# Patient Record
Sex: MALE | Race: WHITE | URBAN - METROPOLITAN AREA
[De-identification: names, ages, dates, MRNs, and addresses within clinical notes are randomized per-mention and may not be internally consistent; named-entity substitution may affect disease eponyms.]

---

## 2017-01-19 ENCOUNTER — CARE COORDINATION (OUTPATIENT)
Dept: GASTROENTEROLOGY | Facility: CLINIC | Age: 37
End: 2017-01-19

## 2017-01-19 NOTE — PROGRESS NOTES
Asked to call Aleisha in regards to the following message: ERIKA Moraes [phone] from [facility] calling from [place], [place] about pt-- Annabella last saw pt in June, recommendation was to come back for further testing. At that time, pt did not have insurance, but he will have some effective as of 2/1/17, so they are wanting to set up f/u appt. Also wondering if he should have MRI prior to him coming back here.     Scheduled Ron for follow-up appt with Dr Reinoso.  Will need to ask him if there are any needs prior to clinic visit or if he wants him to be scheduled for ERCP as well since patient lives past [place].     Previous was scheduled for ERCP but due to lack of insurance he had to cancel procedure. Now has insurance that will begin 2/1/2017.     Will pose the question to Dr Reinoso but as he is out of town till early next week it may take a little longer to get an answer.     Amenable to plan and verbalized understanding.     Mariam NELSON RN Coordinator  Dr. Klein, Dr. Reinoso & Dr. Atkinson  Pancreas~Biliary  [phone] #4

## 2017-01-26 ENCOUNTER — HOSPITAL ENCOUNTER (OUTPATIENT)
Facility: CLINIC | Age: 37
End: 2017-01-26
Attending: INTERNAL MEDICINE | Admitting: INTERNAL MEDICINE
Payer: COMMERCIAL

## 2017-01-26 ENCOUNTER — TELEPHONE (OUTPATIENT)
Dept: GASTROENTEROLOGY | Facility: CLINIC | Age: 37
End: 2017-01-26

## 2017-01-26 ENCOUNTER — CARE COORDINATION (OUTPATIENT)
Dept: GASTROENTEROLOGY | Facility: CLINIC | Age: 37
End: 2017-01-26

## 2017-01-26 DIAGNOSIS — R10.9 ABDOMINAL PAIN: Primary | ICD-10-CM

## 2017-01-26 NOTE — PROGRESS NOTES
Left message for Aleisha JAMES from [facility] [place]: [phone] to discuss the following.    Received direction from Dr Reinoso regarding follow-up: need a contrasted CT of the abomen and an updated MRCP with secretin before the clinic visit which itseff should happen before reserved time in the OR for EUS with transluminal drainage and possible ERCP.    Scheduled to see in clinic 2/28/2017.    Mariam NELSON RN Coordinator  Dr. Klein, Dr. Reinoso & Dr. Atkinson  Pancreas~Biliary  [phone] #4

## 2017-02-27 RX ORDER — LIDOCAINE 40 MG/G
CREAM TOPICAL
Status: CANCELLED | OUTPATIENT
Start: 2017-02-27

## 2017-02-27 RX ORDER — INDOMETHACIN 50 MG/1
100 SUPPOSITORY RECTAL
Status: CANCELLED | OUTPATIENT
Start: 2017-02-27

## 2017-02-27 RX ORDER — SACCHAROMYCES BOULARDII 250 MG
250 CAPSULE ORAL 2 TIMES DAILY
COMMUNITY
End: 2017-02-28 | Stop reason: HOSPADM

## 2017-02-27 RX ORDER — MULTIPLE VITAMINS W/ MINERALS TAB 9MG-400MCG
1 TAB ORAL DAILY
COMMUNITY
End: 2017-02-28 | Stop reason: HOSPADM

## 2017-02-27 RX ORDER — CETIRIZINE HYDROCHLORIDE 10 MG/1
10 TABLET ORAL DAILY
COMMUNITY
End: 2017-02-28 | Stop reason: HOSPADM

## 2017-02-28 ENCOUNTER — OFFICE VISIT (OUTPATIENT)
Dept: GASTROENTEROLOGY | Facility: CLINIC | Age: 37
End: 2017-02-28

## 2017-02-28 VITALS
BODY MASS INDEX: 45.51 KG/M2 | OXYGEN SATURATION: 95 % | SYSTOLIC BLOOD PRESSURE: 122 MMHG | HEART RATE: 90 BPM | DIASTOLIC BLOOD PRESSURE: 83 MMHG | WEIGHT: 315 LBS

## 2017-02-28 DIAGNOSIS — K85.00 IDIOPATHIC ACUTE PANCREATITIS WITHOUT INFECTION OR NECROSIS: Primary | ICD-10-CM

## 2017-02-28 ASSESSMENT — PAIN SCALES - GENERAL: PAINLEVEL: EXTREME PAIN (8)

## 2017-02-28 NOTE — MR AVS SNAPSHOT
After Visit Summary   2/28/2017    Ron Armenta    MRN: [id number]           Patient Information     Date Of Birth          1980        Visit Information        Provider Department      2/28/2017 6:45 AM Vikash Reinoso MD [facility] Pancreas and Biliary        Today's Diagnoses     Idiopathic acute pancreatitis without infection or necrosis    -  1       Follow-ups after your visit        Your next 10 appointments already scheduled     Mar 01, 2017   Procedure with Vikash Reinoso MD   [facility] [place], Same Day Surgery (--)    [address]   [place] [id number] [phone]              Who to contact     Please call your clinic at [phone] to:    Ask questions about your health    Make or cancel appointments    Discuss your medicines    Learn about your test results    Speak to your doctor   If you have compliments or concerns about an experience at your clinic, or if you wish to file a complaint, please contact [facility] Patient Relations at [phone] or email us at [email or web address]         Additional Information About Your Visit        MyChart Information     [employer] gives you secure access to your electronic health record. If you see a primary care provider, you can also send messages to your care team and make appointments. If you have questions, please call your primary care clinic.  If you do not have a primary care provider, please call [phone] and they will assist you.      [employer] is an electronic gateway that provides easy, online access to your medical records. With [employer], you can request a clinic appointment, read your test results, renew a prescription or communicate with your care team.     To access your existing account, please contact your [facility] or call [phone] for assistance.        Care EveryWhere ID     This is your Care EveryWhere ID. This could be used by other  organizations to access your [place] medical records  [id number]        Your Vitals Were     Pulse Pulse Oximetry BMI (Body Mass Index)             90 95% 45.51 kg/m2          Blood Pressure from Last 3 Encounters:   02/28/17 122/83   06/24/16 108/66    Weight from Last 3 Encounters:   02/28/17 (!) 143.9 kg (317 lb 3.2 oz)   02/27/17 (!) 140.6 kg (310 lb)   06/24/16 111 kg (244 lb 12.8 oz)              Today, you had the following     No orders found for display       Primary Care Provider    Kandi Mandiharjinder       No address on file        Thank you!     Thank you for choosing [facility] PANCREAS AND BILIARY  for your care. Our goal is always to provide you with excellent care. Hearing back from our patients is one way we can continue to improve our services. Please take a few minutes to complete the written survey that you may receive in the mail after your visit with us. Thank you!             Your Updated Medication List - Protect others around you: Learn how to safely use, store and throw away your medicines at www.disposemymeds.org.          This list is accurate as of: 2/28/17  7:14 AM.  Always use your most recent med list.                   Brand Name Dispense Instructions for use    albuterol 108 (90 BASE) MCG/ACT Inhaler    PROAIR HFA/PROVENTIL HFA/VENTOLIN HFA     Inhale 2 puffs into the lungs every 6 hours       amylase-lipase-protease 35820 UNITS Cpep    CREON 12     Take 36,000 Units by mouth 3 times daily (with meals)       beclomethasone 40 MCG/ACT Inhaler    QVAR     Inhale 1 puff into the lungs 2 times daily       cetirizine 10 MG tablet    zyrTEC     Take 10 mg by mouth daily       CULTURELLE PO          FLEXERIL PO      Take 10 mg by mouth 3 times daily as needed for muscle spasms       GENTLE STOOL SOFTENER PO          MELATONIN PO      Take 3 mg by mouth nightly as needed       MOTRIN IB PO      Take 200 mg by mouth every 6 hours as needed for moderate pain       Multi-vitamin Tabs tablet       Take 1 tablet by mouth daily       NONFORMULARY      Fiber gummies daily       saccharomyces boulardii 250 MG capsule    FLORASTOR     Take 250 mg by mouth 2 times daily       TRAMADOL HCL PO      Take 100 mg by mouth every 6 hours as needed       TYLENOL PO      Take 325 mg by mouth every 6 hours as needed for mild pain or fever       ZOFRAN PO      Take 8 mg by mouth every 8 hours as needed for nausea or vomiting       ZOLPIDEM TARTRATE PO      Take 5 mg by mouth nightly as needed

## 2017-02-28 NOTE — NURSING NOTE
He is having pain in lower back started feb 6th, rates it at 8. No pain during eating. Has regular bowel movements.denies nausea,voimting.

## 2017-02-28 NOTE — PROGRESS NOTES
Therapeutic Endoscopy Clinic Visit    CC/Referring Physician:  Viji Oneill NP; Ravindra Coronado MD  Question for Consultation:  Follow up of known necrotizing pancreatitis    Assessment and Plan:  Mr Armenta is a delightful 35yo gentleman with a history of complex necrotizing pancreatitis managed in part by surgical cystogastrostomy with imaging and clinical course demonstrating resolving pocket of necrosis without indication for intervention. He is struggling with his weight, obesity, and we discussed lifestyle modifications. A large part of his eating seems to be related to depression.    Plan:  1. Improve lifestyle, including daily exercise and improved eating habits (smaller, lower fat meals)  2. He would like to defer seeing a psychologist  3. No indication based on clinical course and imaging for invasive procedures, we will cancel  4. Repeat imaging in one year by contrasted CT (locally) unless course dictates a shorter interval  5. Continue enzymes at low dose    RTC as clinical course dictates; unexplained fevers, difficulty with postprandial pain, increasing abdominal pain    Thank you for this consultation.  It was a pleasure to participate in the care of this patient; please contact us with any further questions.  A total of 40 minutes was spent with this patient, >50% of which was counseling regarding the above delineated issues.    Vikash Reinoso MD PhD  Interventional and Therapeutic Endoscopy  Director of Endoscopy   of Medicine    [facility]  Department of Medicine  Division of Gastroenterology and Hepatology  [facility] [address] [address]    Clinical                 [phone]  Administrative       [phone]  Administrative Fax [phone]    -------------------------------------------------------------------------------------------------------------------  History of Presentation:  Mr Armenta is a 35yo gentleman  with a history of idiopathic (possibly gallstone, post conner) acute necrotizing pancreatitis complicated by pancreatic empyema managed by chest tube and surgical cystogastrostomy. Imaging from June demonstrated a persistent large pancreatic and peripancreatic walled off necrosis with evidence of previous surgical connection. With continued intermittent severe pain that was debilitating mixed with well intervals we requested reimaging. Due to distance this was delayed and MRI/MRCP/CT from yesterday demonstrates significant decrease in the size of the mostly fluid based lesion, now measuring less than 5cm and without clear main duct communication. Moreover, the lesion now appears within healthy pancreatic parenchyma and more distant from the lumen.    For the past several months Mr Armenta has been without abdominal pain and tolerating a diet without issue. He has no problems with diarrhea, on pancreatic enzymes 36K with meals. There was a period without the enzymes where he loose stools. He is not diabetic and he has gained significant weight since our last visit. He has not had unexplained fevers. He does complain of lower back pain, thought muscle related. He has had some rough personal stuff going on and has known depression.    Review of systemts:  A twelve point review was performed and was otherwise negative beyond what is mentioned in the history above.    Pertinent past medical history:  Past Medical History   Diagnosis Date     DJD (degenerative joint disease)      History of blood transfusion      Pancreatic disease      pancreatitis     Pleural effusion      Previous surgeries:  Past Surgical History   Procedure Laterality Date     Cholecystectomy       Lung surgery       right lung     Pancreas surgery       pseudocyst     Current mediations:  Current Outpatient Prescriptions   Medication     MOTRIN IB PO     Acetaminophen (TYLENOL PO)     cetirizine (ZYRTEC) 10 MG tablet     Ondansetron HCl (ZOFRAN PO)      saccharomyces boulardii (FLORASTOR) 250 MG capsule     NONFORMULARY     beclomethasone (QVAR) 40 MCG/ACT Inhaler     multivitamin, therapeutic with minerals (MULTI-VITAMIN) TABS tablet     Cyclobenzaprine HCl (FLEXERIL PO)     amylase-lipase-protease (CREON 12) 46575 UNITS CPEP     Lactobacillus Rhamnosus, GG, (CULTURELLE PO)     Docusate Sodium (GENTLE STOOL SOFTENER PO)     albuterol (PROAIR HFA, PROVENTIL HFA, VENTOLIN HFA) 108 (90 BASE) MCG/ACT inhaler     TRAMADOL HCL PO     MELATONIN PO     ZOLPIDEM TARTRATE PO     No current facility-administered medications for this visit.      Medications reviewed with patient today, see Medication List/Assessment for details.  No other NSAID/anticoagulation reported by patient.  No other OTC/herbal/supplements reported by patient.    Social history:  Social History     Social History     Marital status:      Spouse name: N/A     Number of children: N/A     Years of education: N/A     Occupational History     Not on file.     Social History Main Topics     Smoking status: Never Smoker     Smokeless tobacco: Not on file     Alcohol use No     Drug use: Not on file     Sexual activity: Not on file     Other Topics Concern     Not on file     Social History Narrative     Family history:  No colon/panc/other GI CA, no other HNPCC-related Chago.  No IBD/celiac, no AI/liver disease.    PHYSICAL EXAMINATION:  Vitals reviewed, AFVSS   Wt   Wt Readings from Last 2 Encounters:   02/28/17 (!) 143.9 kg (317 lb 3.2 oz)   02/27/17 (!) 140.6 kg (310 lb)      Gen: nontoxic, aaox3, cooperative, pleasant, not dyspneic/diaphoretic  HEENT: neck supple, normal op w/o ulcer/exudate, anicteric  Resp/CV unremarkable without significant finding  Abd: obese, large well healed scar, benign, soft, nondistended, intact bs, no hepatosplenomegaly, nontender, no peritoneal signs  Ext: no c/c/e  Skin: warm, perfused, no jaundice  Neuro: grossly intact    Pertinent outside studies:

## 2017-02-28 NOTE — LETTER
2/28/2017       RE: Ron Armenta  [address]  [place] [address]     Dear Colleague,    Thank you for referring your patient, Ron Armenta, to the [facility] PANCREAS [facility] BIL[facility]. Please see a copy of my visit note below.    Therapeutic Endoscopy Clinic Visit    CC/Referring Physician:  Viji Su NP; Ravindra Coronado MD  Question for Consultation:  Follow up of known necrotizing pancreatitis    Assessment and Plan:  Mr Armenta is a delightful 35yo gentleman with a history of complex necrotizing pancreatitis managed in part by surgical cystogastrostomy with imaging and clinical course demonstrating resolving pocket of necrosis without indication for intervention. He is struggling with his weight, obesity, and we discussed lifestyle modifications. A large part of his eating seems to be related to depression.    Plan:  1. Improve lifestyle, including daily exercise and improved eating habits (smaller, lower fat meals)  2. He would like to defer seeing a psychologist  3. No indication based on clinical course and imaging for invasive procedures, we will cancel  4. Repeat imaging in one year by contrasted CT (locally) unless course dictates a shorter interval  5. Continue enzymes at low dose    RTC as clinical course dictates; unexplained fevers, difficulty with postprandial pain, increasing abdominal pain    Thank you for this consultation.  It was a pleasure to participate in the care of this patient; please contact us with any further questions.  A total of 40 minutes was spent with this patient, >50% of which was counseling regarding the above delineated issues.    Vikash Reinoso MD PhD  Interventional and Therapeutic Endoscopy  Director of Endoscopy   of Medicine    [facility]  Department of Medicine  Division of Gastroenterology and Hepatology  [facility] [address] [place], [place]  [address]    Clinical                 [phone]  Administrative       [phone]  Administrative Fax [phone]    -------------------------------------------------------------------------------------------------------------------  History of Presentation:  Mr Armenta is a 35yo gentleman with a history of idiopathic (possibly gallstone, post conner) acute necrotizing pancreatitis complicated by pancreatic empyema managed by chest tube and surgical cystogastrostomy. Imaging from June demonstrated a persistent large pancreatic and peripancreatic walled off necrosis with evidence of previous surgical connection. With continued intermittent severe pain that was debilitating mixed with well intervals we requested reimaging. Due to distance this was delayed and MRI/MRCP/CT from yesterday demonstrates significant decrease in the size of the mostly fluid based lesion, now measuring less than 5cm and without clear main duct communication. Moreover, the lesion now appears within healthy pancreatic parenchyma and more distant from the lumen.    For the past several months Mr Armenta has been without abdominal pain and tolerating a diet without issue. He has no problems with diarrhea, on pancreatic enzymes 36K with meals. There was a period without the enzymes where he loose stools. He is not diabetic and he has gained significant weight since our last visit. He has not had unexplained fevers. He does complain of lower back pain, thought muscle related. He has had some rough personal stuff going on and has known depression.    Review of systemts:  A twelve point review was performed and was otherwise negative beyond what is mentioned in the history above.    Pertinent past medical history:  Past Medical History   Diagnosis Date     DJD (degenerative joint disease)      History of blood transfusion      Pancreatic disease      pancreatitis     Pleural effusion      Previous surgeries:  Past Surgical History   Procedure  Laterality Date     Cholecystectomy       Lung surgery       right lung     Pancreas surgery       pseudocyst     Current mediations:  Current Outpatient Prescriptions   Medication     MOTRIN IB PO     Acetaminophen (TYLENOL PO)     cetirizine (ZYRTEC) 10 MG tablet     Ondansetron HCl (ZOFRAN PO)     saccharomyces boulardii (FLORASTOR) 250 MG capsule     NONFORMULARY     beclomethasone (QVAR) 40 MCG/ACT Inhaler     multivitamin, therapeutic with minerals (MULTI-VITAMIN) TABS tablet     Cyclobenzaprine HCl (FLEXERIL PO)     amylase-lipase-protease (CREON 12) 60021 UNITS CPEP     Lactobacillus Rhamnosus, GG, (CULTURELLE PO)     Docusate Sodium (GENTLE STOOL SOFTENER PO)     albuterol (PROAIR HFA, PROVENTIL HFA, VENTOLIN HFA) 108 (90 BASE) MCG/ACT inhaler     TRAMADOL HCL PO     MELATONIN PO     ZOLPIDEM TARTRATE PO     No current facility-administered medications for this visit.      Medications reviewed with patient today, see Medication List/Assessment for details.  No other NSAID/anticoagulation reported by patient.  No other OTC/herbal/supplements reported by patient.    Social history:  Social History     Social History     Marital status:      Spouse name: N/A     Number of children: N/A     Years of education: N/A     Occupational History     Not on file.     Social History Main Topics     Smoking status: Never Smoker     Smokeless tobacco: Not on file     Alcohol use No     Drug use: Not on file     Sexual activity: Not on file     Other Topics Concern     Not on file     Social History Narrative     Family history:  No colon/panc/other GI CA, no other HNPCC-related Chago.  No IBD/celiac, no AI/liver disease.    PHYSICAL EXAMINATION:  Vitals reviewed, AFVSS   Wt   Wt Readings from Last 2 Encounters:   02/28/17 (!) 143.9 kg (317 lb 3.2 oz)   02/27/17 (!) 140.6 kg (310 lb)      Gen: nontoxic, aaox3, cooperative, pleasant, not dyspneic/diaphoretic  HEENT: neck supple, normal op w/o ulcer/exudate,  anicteric  Resp/CV unremarkable without significant finding  Abd: obese, large well healed scar, benign, soft, nondistended, intact bs, no hepatosplenomegaly, nontender, no peritoneal signs  Ext: no c/c/e  Skin: warm, perfused, no jaundice  Neuro: grossly intact    Pertinent outside studies:        Again, thank you for allowing me to participate in the care of your patient.      Sincerely,    Vikash Reinoso MD

## 2018-01-04 DIAGNOSIS — R10.9 ABDOMINAL PAIN: Primary | ICD-10-CM

## 2018-07-11 ENCOUNTER — RADIANT APPOINTMENT (OUTPATIENT)
Dept: CT IMAGING | Facility: CLINIC | Age: 38
End: 2018-07-11
Attending: INTERNAL MEDICINE
Payer: COMMERCIAL

## 2018-07-11 DIAGNOSIS — R10.9 ABDOMINAL PAIN: ICD-10-CM

## 2018-07-11 RX ORDER — IOPAMIDOL 755 MG/ML
135 INJECTION, SOLUTION INTRAVASCULAR ONCE
Status: COMPLETED | OUTPATIENT
Start: 2018-07-11 | End: 2018-07-11

## 2018-07-11 RX ADMIN — IOPAMIDOL 135 ML: 755 INJECTION, SOLUTION INTRAVASCULAR at 11:59

## 2018-07-11 NOTE — DISCHARGE INSTRUCTIONS

## 2018-07-12 ENCOUNTER — OFFICE VISIT (OUTPATIENT)
Dept: GASTROENTEROLOGY | Facility: CLINIC | Age: 38
End: 2018-07-12
Payer: COMMERCIAL

## 2018-07-12 VITALS
TEMPERATURE: 98.8 F | HEIGHT: 70 IN | OXYGEN SATURATION: 94 % | HEART RATE: 102 BPM | DIASTOLIC BLOOD PRESSURE: 85 MMHG | SYSTOLIC BLOOD PRESSURE: 136 MMHG | WEIGHT: 315 LBS | BODY MASS INDEX: 45.1 KG/M2

## 2018-07-12 DIAGNOSIS — K85.11 ACUTE BILIARY PANCREATITIS WITH UNINFECTED NECROSIS: Primary | ICD-10-CM

## 2018-07-12 RX ORDER — LATANOPROST 50 UG/ML
SOLUTION/ DROPS OPHTHALMIC
Refills: 3 | COMMUNITY
Start: 2018-05-30

## 2018-07-12 ASSESSMENT — PAIN SCALES - GENERAL: PAINLEVEL: EXTREME PAIN (9)

## 2018-07-12 NOTE — NURSING NOTE
"Chief Complaint   Patient presents with     Consult     Patient returning for 1 year follow-up.       Vitals:    07/12/18 0950   BP: 136/85   BP Location: Left arm   Patient Position: Sitting   Cuff Size: Adult Regular   Pulse: 102   Temp: 98.8  F (37.1  C)   TempSrc: Oral   SpO2: 94%   Weight: (!) 343 lb 14.4 oz   Height: 5' 10\"       Body mass index is 49.34 kg/(m^2).      Altagracia Bah, EMT                      "

## 2018-07-12 NOTE — MR AVS SNAPSHOT
After Visit Summary   7/12/2018    Ron Armenta    MRN: [id number]           Patient Information     Date Of Birth          1980        Visit Information        Provider Department      7/12/2018 10:15 AM Vikash Reinoso MD [facility] Pancreas and Biliary        Today's Diagnoses     Acute biliary pancreatitis with uninfected necrosis    -  1       Follow-ups after your visit        Your next 10 appointments already scheduled     Jul 12, 2018 10:15 AM CDT   (Arrive by 10:00 AM)   Return Visit with MD BLAIR [facility] Pancreas and Biliary ([facility])    [address] [place] [id number] [phone]              Who to contact     Please call your clinic at [phone] to:    Ask questions about your health    Make or cancel appointments    Discuss your medicines    Learn about your test results    Speak to your doctor            Additional Information About Your Visit        MyChart Information     [employer] gives you secure access to your electronic health record. If you see a primary care provider, you can also send messages to your care team and make appointments. If you have questions, please call your primary care clinic.  If you do not have a primary care provider, please call [phone] and they will assist you.      [employer] is an electronic gateway that provides easy, online access to your medical records. With [employer], you can request a clinic appointment, read your test results, renew a prescription or communicate with your care team.     To access your existing account, please contact your [facility] or call [phone] for assistance.        Care EveryWhere ID     This is your Care EveryWhere ID. This could be used by other organizations to access your [place] medical records  [id number]        Your Vitals Were     Pulse Temperature Height Pulse Oximetry BMI (Body Mass  "Index)       102 98.8  F (37.1  C) (Oral) 5' 10\" 94% 49.34 kg/m2        Blood Pressure from Last 3 Encounters:   07/12/18 136/85   02/28/17 122/83   06/24/16 108/66    Weight from Last 3 Encounters:   07/12/18 (!) 343 lb 14.4 oz   02/28/17 (!) 317 lb 3.2 oz   02/27/17 (!) 310 lb              Today, you had the following     No orders found for display       Primary Care Provider    Kandi Munoz       No address on file        Equal Access to Services     [facility]: Hadii aad ku keeo Son[place]ah, waaxda luqadaha, qaybta kaalmada anupama, caryl wagoner . So [facility] [phone].    ATENCIÓN: Si habla español, tiene a dave disposición servicios gratuitos de asistencia lingüística. Llame al [phone].    We comply with applicable federal civil rights laws and [place] laws. We do not discriminate on the basis of race, color, national origin, age, disability, sex, sexual orientation, or gender identity.            Thank you!     Thank you for choosing [facility] PANCREAS AND BILIARY  for your care. Our goal is always to provide you with excellent care. Hearing back from our patients is one way we can continue to improve our services. Please take a few minutes to complete the written survey that you may receive in the mail after your visit with us. Thank you!             Your Updated Medication List - Protect others around you: Learn how to safely use, store and throw away your medicines at www.disposemymeds.org.          This list is accurate as of 7/12/18 10:08 AM.  Always use your most recent med list.                   Brand Name Dispense Instructions for use Diagnosis    albuterol 108 (90 Base) MCG/ACT Inhaler    PROAIR HFA/PROVENTIL HFA/VENTOLIN HFA     Inhale 2 puffs into the lungs every 6 hours        amylase-lipase-protease 67698 units Cpep    CREON 12     Take 36,000 Units by mouth 3 times daily (with meals)        CULTURELLE PO           GENTLE STOOL SOFTENER PO           " latanoprost 0.005 % ophthalmic solution    XALATAN     USE ONE DROP IN BOTH EYES DAILY AT BEDTIME        MELATONIN PO      Take 3 mg by mouth nightly as needed        MOTRIN IB PO      Take 200 mg by mouth every 6 hours as needed for moderate pain        TRAMADOL HCL PO      Take 100 mg by mouth every 6 hours as needed        TYLENOL PO      Take 325 mg by mouth every 6 hours as needed for mild pain or fever        ZOLPIDEM TARTRATE PO      Take 5 mg by mouth nightly as needed

## 2018-07-12 NOTE — PROGRESS NOTES
Therapeutic Endoscopy Clinic Visit    CC/Referring Physician:  Viji Oneill NP and Ravindra Coronado MD  Question for Consultation:  Follow up for known necrotizing pancreatitis    Assessment and Plan:  Mr Armenta is a 37yo gentleman with a history of necrotizing pancreatitis managed elsewhere by surgical cystogastrostomy. Given his slow steady improvement since that procedure, we have elected to defer invasive procedures. He will continue his low dose pancreatic enzymes. We will obtain interval imaging and or organize follow up clinic visits with evidence of recurrent pancreatic issues. He is instructed to phone us with concerns of novel epigastric pain, decreased appetite and or fevers otherwise not explained. Dr Coronado will follow up on all GI related issues, and he has my cell number if he has concerns.    RTC as clinical course dictates    Thank you for this consultation.  It was a pleasure to participate in the care of this patient; please contact us with any further questions.  A total of 40 minutes was spent in face to face evaluation with this patient, >50% of which was counseling regarding the above delineated issues.    Vikash Reinoso MD PhD  Director of Endoscopy  Associate Professor of Medicine, Surgery and Pediatrics  Interventional and Therapeutic Endoscopy    [facility]  Division of Gastroenterology and Hepatology  [facility] [address]  [address] [id number]    New Consultations  [phone]  Procedure Scheduling [phone]  Clinical Nurse Coordinator [phone]  Clinical Fax   [phone]  Administrative   [phone]  Administrative Fax  [phone]    -------------------------------------------------------------------------------------------------------------------  History of Presentation:   Mr Armenta is a 38 year old gentleman with a history of complex necrotizing pancreatitis managed in large part bu surgical cystogastrostomy whom we have  been following by noninvasive imaging alone. His interval imaging from yesterday demonstrated continued resolution of the residual necrotic collections with only a few intrapancreatic regions remaining, though these are near only 1cm in size. There is evidence of chronic inflammation and his known bilateral femoral head AVN. We had asked that he improve his lifestyle, including daily exercise and improved eating habits (grazing, low fat). He has continued on low dose enzymes.    He does note some left lower quadrant pain 2-3x a month, lasting a few hours without associations. The pain spontaneously resolves with rest. His stools have no impact. He notes that it could be related to his hip. He has not had unexplained fevers. He continues with the low dose Creon and he believes it helps. With cessation, he notes constipation.    He does not smoke or drink. His insult was deemed idiopathic. His gallbladder is out and he has not had issues with his biliary system.    Interval CT 7/11/18  IMPRESSION:   1. When compared to CT 2/27/2017, features of prior necrotizing  pancreatitis have improved. Please seen dominant intrapancreatic  collection has significantly decreased in size. There are a few small  intrapancreatic collections, the largest of which measures up to 1.2  cm region of the pancreatic neck.  2. Mild amorphous increased attenuation about the pancreas with  associated tethering of the gastric antrum and possibly the first  portion of the duodenum. Findings likely represent chronic  inflammation though it would be difficult to exclude a mild degree of  underlying or resolving acute inflammation.  Correlation with serum  lipase could be considered if clinically indicated.  3. Bilateral femoral head AVN with associated subchondral collapse,  right greater than left. Findings are stable.      Review of systems:  A twelve point review was performed and was otherwise negative beyond what is mentioned in the history  above.    Pertinent past medical history:  Past Medical History:   Diagnosis Date     DJD (degenerative joint disease)      History of blood transfusion      Pancreatic disease     pancreatitis     Pleural effusion        Previous surgeries:  Past Surgical History:   Procedure Laterality Date     CHOLECYSTECTOMY       LUNG SURGERY      right lung     PANCREAS SURGERY      pseudocyst     Current mediations:  Current Outpatient Prescriptions   Medication     albuterol (PROAIR HFA, PROVENTIL HFA, VENTOLIN HFA) 108 (90 BASE) MCG/ACT inhaler     amylase-lipase-protease (CREON 12) 84184 UNITS CPEP     Docusate Sodium (GENTLE STOOL SOFTENER PO)     Lactobacillus Rhamnosus, GG, (CULTURELLE PO)     latanoprost (XALATAN) 0.005 % ophthalmic solution     MELATONIN PO     MOTRIN IB PO     TRAMADOL HCL PO     ZOLPIDEM TARTRATE PO     Acetaminophen (TYLENOL PO)     [DISCONTINUED] beclomethasone (QVAR) 40 MCG/ACT Inhaler     No current facility-administered medications for this visit.      Medications reviewed with patient today, see Medication List/Assessment for details.  No other NSAID/anticoagulation reported by patient.  No other OTC/herbal/supplements reported by patient.    Social history:  Social History     Social History     Marital status:      Spouse name: N/A     Number of children: N/A     Years of education: N/A     Occupational History     Not on file.     Social History Main Topics     Smoking status: Never Smoker     Smokeless tobacco: Not on file     Alcohol use No     Drug use: Not on file     Sexual activity: Not on file     Other Topics Concern     Not on file     Social History Narrative       Family history:  No family history on file.  Family history reviewed and updated in EPIC  No colon/panc/other GI CA, no other HNPCC-related Chago.  No IBD/celiac, no AI/liver disease.    PHYSICAL EXAMINATION:  Vitals reviewed, AFVSS   Wt   Wt Readings from Last 2 Encounters:   02/28/17 (!) 317 lb 3.2 oz   02/27/17  (!) 310 lb      Gen: nontoxic, aaox3, cooperative, pleasant, not dyspneic/diaphoretic  HEENT: neck supple, normal op w/o ulcer/exudate, anicteric  Resp/CV unremarkable without significant finding  Abd: benign, soft, nondistended, intact bs, no hepatosplenomegaly, nontender, no peritoneal signs  Ext: no c/c/e  Skin: warm, perfused, no jaundice  Neuro: grossly intact    Pertinent outside studies:      Answers for HPI/ROS submitted by the patient on 7/12/2018   PHQ-2 Score: 2

## 2018-09-03 ENCOUNTER — TRANSFERRED RECORDS (OUTPATIENT)
Dept: HEALTH INFORMATION MANAGEMENT | Facility: CLINIC | Age: 38
End: 2018-09-03

## 2019-01-21 ENCOUNTER — TRANSFERRED RECORDS (OUTPATIENT)
Dept: HEALTH INFORMATION MANAGEMENT | Facility: CLINIC | Age: 39
End: 2019-01-21

## 2019-01-24 ENCOUNTER — TRANSFERRED RECORDS (OUTPATIENT)
Dept: HEALTH INFORMATION MANAGEMENT | Facility: CLINIC | Age: 39
End: 2019-01-24

## 2019-02-12 ENCOUNTER — MEDICAL CORRESPONDENCE (OUTPATIENT)
Dept: HEALTH INFORMATION MANAGEMENT | Facility: CLINIC | Age: 39
End: 2019-02-12

## 2019-02-21 ENCOUNTER — TRANSFERRED RECORDS (OUTPATIENT)
Dept: HEALTH INFORMATION MANAGEMENT | Facility: CLINIC | Age: 39
End: 2019-02-21

## 2019-02-24 ENCOUNTER — TRANSFERRED RECORDS (OUTPATIENT)
Dept: HEALTH INFORMATION MANAGEMENT | Facility: CLINIC | Age: 39
End: 2019-02-24

## 2019-02-26 ENCOUNTER — TRANSFERRED RECORDS (OUTPATIENT)
Dept: HEALTH INFORMATION MANAGEMENT | Facility: CLINIC | Age: 39
End: 2019-02-26

## 2020-03-10 ENCOUNTER — HEALTH MAINTENANCE LETTER (OUTPATIENT)
Age: 40
End: 2020-03-10

## 2020-07-13 NOTE — LETTER
7/12/2018       RE: Ron Armenta  [address]  [place] [address]     Dear Colleague,    Thank you for referring your patient, Ron Armenta, to the [facility] at [facility]. Please see a copy of my visit note below.    Therapeutic Endoscopy Clinic Visit    CC/Referring Physician:  Viji Oneill NP and Ravindra Coronado MD  Question for Consultation:  Follow up for known necrotizing pancreatitis    Assessment and Plan:  Mr Armenta is a 39yo gentleman with a history of necrotizing pancreatitis managed elsewhere by surgical cystogastrostomy. Given his slow steady improvement since that procedure, we have elected to defer invasive procedures. He will continue his low dose pancreatic enzymes. We will obtain interval imaging and or organize follow up clinic visits with evidence of recurrent pancreatic issues. He is instructed to phone us with concerns of novel epigastric pain, decreased appetite and or fevers otherwise not explained. Dr Coronado will follow up on all GI related issues, and he has my cell number if he has concerns.    RTC as clinical course dictates    Thank you for this consultation.  It was a pleasure to participate in the care of this patient; please contact us with any further questions.  A total of 40 minutes was spent in face to face evaluation with this patient, >50% of which was counseling regarding the above delineated issues.    Vikash Reinoso MD PhD  Director of Endoscopy  Associate Professor of Medicine, Surgery and Pediatrics  Interventional and Therapeutic Endoscopy    [facility]  Division of Gastroenterology and Hepatology  [facility] [address]  [address], [place] [address]    New Consultations  [phone]  Procedure Scheduling [phone]  Clinical Nurse Coordinator [phone]  Clinical Fax   [phone]  Administrative   [phone]  Administrative  Attempted follow up with patient twice during his treatment today. Patient was sleeping and didn't want to disturb. Will meet with patient at his next appointment. Goldie Kong, MARTHAW, RN, OCN  Oncology Nurse Navigator Fax  [phone]    -------------------------------------------------------------------------------------------------------------------  History of Presentation:   Mr Armenta is a 38 year old gentleman with a history of complex necrotizing pancreatitis managed in large part bu surgical cystogastrostomy whom we have been following by noninvasive imaging alone. His interval imaging from yesterday demonstrated continued resolution of the residual necrotic collections with only a few intrapancreatic regions remaining, though these are near only 1cm in size. There is evidence of chronic inflammation and his known bilateral femoral head AVN. We had asked that he improve his lifestyle, including daily exercise and improved eating habits (grazing, low fat). He has continued on low dose enzymes.    He does note some left lower quadrant pain 2-3x a month, lasting a few hours without associations. The pain spontaneously resolves with rest. His stools have no impact. He notes that it could be related to his hip. He has not had unexplained fevers. He continues with the low dose Creon and he believes it helps. With cessation, he notes constipation.    He does not smoke or drink. His insult was deemed idiopathic. His gallbladder is out and he has not had issues with his biliary system.    Interval CT 7/11/18  IMPRESSION:   1. When compared to CT 2/27/2017, features of prior necrotizing  pancreatitis have improved. Please seen dominant intrapancreatic  collection has significantly decreased in size. There are a few small  intrapancreatic collections, the largest of which measures up to 1.2  cm region of the pancreatic neck.  2. Mild amorphous increased attenuation about the pancreas with  associated tethering of the gastric antrum and possibly the first  portion of the duodenum. Findings likely represent chronic  inflammation though it would be difficult to exclude a mild degree of  underlying or resolving acute inflammation.   Correlation with serum  lipase could be considered if clinically indicated.  3. Bilateral femoral head AVN with associated subchondral collapse,  right greater than left. Findings are stable.      Review of systems:  A twelve point review was performed and was otherwise negative beyond what is mentioned in the history above.    Pertinent past medical history:  Past Medical History:   Diagnosis Date     DJD (degenerative joint disease)      History of blood transfusion      Pancreatic disease     pancreatitis     Pleural effusion        Previous surgeries:  Past Surgical History:   Procedure Laterality Date     CHOLECYSTECTOMY       LUNG SURGERY      right lung     PANCREAS SURGERY      pseudocyst     Current mediations:  Current Outpatient Prescriptions   Medication     albuterol (PROAIR HFA, PROVENTIL HFA, VENTOLIN HFA) 108 (90 BASE) MCG/ACT inhaler     amylase-lipase-protease (CREON 12) 15597 UNITS CPEP     Docusate Sodium (GENTLE STOOL SOFTENER PO)     Lactobacillus Rhamnosus, GG, (CULTURELLE PO)     latanoprost (XALATAN) 0.005 % ophthalmic solution     MELATONIN PO     MOTRIN IB PO     TRAMADOL HCL PO     ZOLPIDEM TARTRATE PO     Acetaminophen (TYLENOL PO)     [DISCONTINUED] beclomethasone (QVAR) 40 MCG/ACT Inhaler     No current facility-administered medications for this visit.      Medications reviewed with patient today, see Medication List/Assessment for details.  No other NSAID/anticoagulation reported by patient.  No other OTC/herbal/supplements reported by patient.    Social history:  Social History     Social History     Marital status:      Spouse name: N/A     Number of children: N/A     Years of education: N/A     Occupational History     Not on file.     Social History Main Topics     Smoking status: Never Smoker     Smokeless tobacco: Not on file     Alcohol use No     Drug use: Not on file     Sexual activity: Not on file     Other Topics Concern     Not on file     Social History Narrative        Family history:  No family history on file.  Family history reviewed and updated in EPIC  No colon/panc/other GI CA, no other HNPCC-related Chago.  No IBD/celiac, no AI/liver disease.    PHYSICAL EXAMINATION:  Vitals reviewed, AFVSS   Wt   Wt Readings from Last 2 Encounters:   02/28/17 (!) 317 lb 3.2 oz   02/27/17 (!) 310 lb      Gen: nontoxic, aaox3, cooperative, pleasant, not dyspneic/diaphoretic  HEENT: neck supple, normal op w/o ulcer/exudate, anicteric  Resp/CV unremarkable without significant finding  Abd: benign, soft, nondistended, intact bs, no hepatosplenomegaly, nontender, no peritoneal signs  Ext: no c/c/e  Skin: warm, perfused, no jaundice  Neuro: grossly intact       Again, thank you for allowing me to participate in the care of your patient.      Sincerely,    Vikash Reinoso MD

## 2020-12-27 ENCOUNTER — HEALTH MAINTENANCE LETTER (OUTPATIENT)
Age: 40
End: 2020-12-27

## 2021-04-24 ENCOUNTER — HEALTH MAINTENANCE LETTER (OUTPATIENT)
Age: 41
End: 2021-04-24

## 2021-10-04 ENCOUNTER — HEALTH MAINTENANCE LETTER (OUTPATIENT)
Age: 41
End: 2021-10-04

## 2022-05-15 ENCOUNTER — HEALTH MAINTENANCE LETTER (OUTPATIENT)
Age: 42
End: 2022-05-15

## 2022-09-11 ENCOUNTER — HEALTH MAINTENANCE LETTER (OUTPATIENT)
Age: 42
End: 2022-09-11

## 2023-06-03 ENCOUNTER — HEALTH MAINTENANCE LETTER (OUTPATIENT)
Age: 43
End: 2023-06-03